# Patient Record
Sex: FEMALE | Race: OTHER | NOT HISPANIC OR LATINO | URBAN - METROPOLITAN AREA
[De-identification: names, ages, dates, MRNs, and addresses within clinical notes are randomized per-mention and may not be internally consistent; named-entity substitution may affect disease eponyms.]

---

## 2018-06-17 ENCOUNTER — INPATIENT (INPATIENT)
Facility: HOSPITAL | Age: 48
LOS: 0 days | Discharge: HOME | End: 2018-06-18
Attending: INTERNAL MEDICINE | Admitting: INTERNAL MEDICINE
Payer: COMMERCIAL

## 2018-06-17 VITALS
HEART RATE: 91 BPM | DIASTOLIC BLOOD PRESSURE: 83 MMHG | SYSTOLIC BLOOD PRESSURE: 139 MMHG | RESPIRATION RATE: 17 BRPM | TEMPERATURE: 98 F | OXYGEN SATURATION: 100 %

## 2018-06-17 DIAGNOSIS — Z29.9 ENCOUNTER FOR PROPHYLACTIC MEASURES, UNSPECIFIED: ICD-10-CM

## 2018-06-17 DIAGNOSIS — R07.9 CHEST PAIN, UNSPECIFIED: ICD-10-CM

## 2018-06-17 DIAGNOSIS — I20.0 UNSTABLE ANGINA: ICD-10-CM

## 2018-06-17 LAB
ALBUMIN SERPL ELPH-MCNC: 3.9 G/DL — SIGNIFICANT CHANGE UP (ref 3.3–5)
ALP SERPL-CCNC: 80 U/L — SIGNIFICANT CHANGE UP (ref 40–120)
ALT FLD-CCNC: 15 U/L — SIGNIFICANT CHANGE UP (ref 4–33)
AST SERPL-CCNC: 22 U/L — SIGNIFICANT CHANGE UP (ref 4–32)
BILIRUB SERPL-MCNC: 0.4 MG/DL — SIGNIFICANT CHANGE UP (ref 0.2–1.2)
BUN SERPL-MCNC: 10 MG/DL — SIGNIFICANT CHANGE UP (ref 7–23)
CALCIUM SERPL-MCNC: 8.7 MG/DL — SIGNIFICANT CHANGE UP (ref 8.4–10.5)
CHLORIDE SERPL-SCNC: 102 MMOL/L — SIGNIFICANT CHANGE UP (ref 98–107)
CO2 SERPL-SCNC: 24 MMOL/L — SIGNIFICANT CHANGE UP (ref 22–31)
CREAT SERPL-MCNC: 0.73 MG/DL — SIGNIFICANT CHANGE UP (ref 0.5–1.3)
D DIMER BLD IA.RAPID-MCNC: < 150 NG/ML — SIGNIFICANT CHANGE UP
GLUCOSE SERPL-MCNC: 103 MG/DL — HIGH (ref 70–99)
HCT VFR BLD CALC: 36.4 % — SIGNIFICANT CHANGE UP (ref 34.5–45)
HGB BLD-MCNC: 11.5 G/DL — SIGNIFICANT CHANGE UP (ref 11.5–15.5)
MCHC RBC-ENTMCNC: 28.9 PG — SIGNIFICANT CHANGE UP (ref 27–34)
MCHC RBC-ENTMCNC: 31.6 % — LOW (ref 32–36)
MCV RBC AUTO: 91.5 FL — SIGNIFICANT CHANGE UP (ref 80–100)
NRBC # FLD: 0 — SIGNIFICANT CHANGE UP
PLATELET # BLD AUTO: 271 K/UL — SIGNIFICANT CHANGE UP (ref 150–400)
PMV BLD: 9.8 FL — SIGNIFICANT CHANGE UP (ref 7–13)
POTASSIUM SERPL-MCNC: 4.4 MMOL/L — SIGNIFICANT CHANGE UP (ref 3.5–5.3)
POTASSIUM SERPL-SCNC: 4.4 MMOL/L — SIGNIFICANT CHANGE UP (ref 3.5–5.3)
PROT SERPL-MCNC: 6.9 G/DL — SIGNIFICANT CHANGE UP (ref 6–8.3)
RBC # BLD: 3.98 M/UL — SIGNIFICANT CHANGE UP (ref 3.8–5.2)
RBC # FLD: 13.5 % — SIGNIFICANT CHANGE UP (ref 10.3–14.5)
SODIUM SERPL-SCNC: 140 MMOL/L — SIGNIFICANT CHANGE UP (ref 135–145)
TROPONIN T, HIGH SENSITIVITY RESULT: < 6 NG/L — SIGNIFICANT CHANGE UP (ref ?–14)
TROPONIN T, HIGH SENSITIVITY RESULT: < 6 NG/L — SIGNIFICANT CHANGE UP (ref ?–14)
WBC # BLD: 4.66 K/UL — SIGNIFICANT CHANGE UP (ref 3.8–10.5)
WBC # FLD AUTO: 4.66 K/UL — SIGNIFICANT CHANGE UP (ref 3.8–10.5)

## 2018-06-17 PROCEDURE — 71046 X-RAY EXAM CHEST 2 VIEWS: CPT | Mod: 26

## 2018-06-17 RX ORDER — ASPIRIN/CALCIUM CARB/MAGNESIUM 324 MG
324 TABLET ORAL ONCE
Qty: 0 | Refills: 0 | Status: COMPLETED | OUTPATIENT
Start: 2018-06-17 | End: 2018-06-17

## 2018-06-17 RX ORDER — NORETHINDRONE AND ETHINYL ESTRADIOL 0.4-0.035
1 KIT ORAL
Qty: 0 | Refills: 0 | COMMUNITY

## 2018-06-17 RX ORDER — ASPIRIN/CALCIUM CARB/MAGNESIUM 324 MG
81 TABLET ORAL DAILY
Qty: 0 | Refills: 0 | Status: DISCONTINUED | OUTPATIENT
Start: 2018-06-18 | End: 2018-06-18

## 2018-06-17 RX ADMIN — Medication 324 MILLIGRAM(S): at 09:17

## 2018-06-17 NOTE — ED PROVIDER NOTE - MEDICAL DECISION MAKING DETAILS
CP, diaphoretic, lue numb EKG nonisch PLAN ce, dimer, xr, cdu for nuc stress. 06:30A CDU full, LUE symptoms persistent, will admit tele

## 2018-06-17 NOTE — H&P ADULT - RS GEN PE MLT RESP DETAILS PC
good air movement/no rales/no wheezes/no rhonchi/respirations non-labored/clear to auscultation bilaterally/airway patent/breath sounds equal/no chest wall tenderness

## 2018-06-17 NOTE — ED ADULT NURSE REASSESSMENT NOTE - NS ED NURSE REASSESS COMMENT FT1
Pt received to rm #8.  A+Ox3.  skin warm and dry.  resp even, non-labored.  well appearing female in nad.  c/o 3/10 chest pain w inspiration.  denies cough/fevers. Left AC SL dry and intact. VSS. Admitted.  Awaits bed assignment. continuous cardiac monitoring in progress.

## 2018-06-17 NOTE — ED ADULT TRIAGE NOTE - CHIEF COMPLAINT QUOTE
pt. came in c/o intermittent left sided chest pain radiating to left arm and back since last night around 7pm. pt. states she woke up an hour ago with same pain and diaphoresis and tingling sensation on left arm. denies any n/v nor SOB. no PMHx. EKG in progress.

## 2018-06-17 NOTE — H&P ADULT - NEGATIVE CARDIOVASCULAR SYMPTOMS
no paroxysmal nocturnal dyspnea/no dyspnea on exertion/no orthopnea/no peripheral edema/no palpitations/no claudication

## 2018-06-17 NOTE — ED ADULT NURSE NOTE - OBJECTIVE STATEMENT
alert c/o sternal chest pain currently radiating to left upper back and left arm  left arm is also numb  c/o SOB  breath sounds audible mateus clear  SaO2 100% NSR on scope

## 2018-06-17 NOTE — H&P ADULT - ATTENDING COMMENTS
48 year old woman with no reported medical history and on OCP presents with chest pain concerning for angina.    #Angina-  Patient has ruled out for ACS and there are no ischemic EKG changes.  D-dimer negative.  Plan for further ischemic evaluation with exercise nuclear stress test.  Urine hcg negative in ER.     Care discussed with patient and family at bedside.

## 2018-06-17 NOTE — H&P ADULT - HISTORY OF PRESENT ILLNESS
47 yo female with NO Significant PMHx p/w chest pain at rest since last night, worse over night. Chest pain described as substernal chest tightness, 6/10 at 1900, radiating to left scapula, pleuritic in nature. Pt's pain was tolerable and she went to sleep. At 2am chest pain worsened and woke her up from sleep and constant in nature, lasting 2-3 hrs accompanied by cold sweat and her  brought to [facility] ED. She denies fever, chills, palpitations, nausea, vomiting or abdominal pain. Never felt like this before with no ischemic work up in the past.

## 2018-06-17 NOTE — ED PROVIDER NOTE - PROGRESS NOTE DETAILS
ELA DAVISON:  Chest pain improved.  D dimer negative.  Pt accepted to Dr. Rodriguez. ELA DAVISON:  Chest pain improved.  D dimer negative.  CDU full. Pt accepted to Dr. Rodriguez.

## 2018-06-17 NOTE — ED PROVIDER NOTE - OBJECTIVE STATEMENT
pt. came in c/o intermittent left sided chest pain radiating to left arm and back since last night around 7pm. pt. states she woke up an hour ago with same pain and diaphoresis and tingling sensation on left arm. denies any n/v nor SOB. no PMHx. EKG in progress.  chest pain  06:03 Marie att: 48F c/o cp 06:03 Marie att: 48F c/o cp x hours. Yest 19:00 patient at rest when she noted cp, midsternal, x seconds. This morning 2A cp, midst cp, lue numb, sob, pleurisy. HEART one PERC ocp PMH x PSH x MED ocp FH x 06:03 Marie att: 48F c/o cp x hours. Yest 19:00 patient at rest when she noted cp, midsternal, x seconds. This morning 2A cp, midst cp, lue numb, sob, diaphoretic, pleurisy. At present cp resolved, lue persistently numb. HEART one PERC ocp PMH x PSH x MED ocp FH x 06:03 Marie att: 48F c/o cp x hours. Yest 19:00 patient at rest when she noted cp, midsternal, x seconds. This morning 2A cp, midst cp, lue numb, diaphoretic, sob, pleurisy. At present cp resolved but lue persistently numb. HEART 1 (age) PERC ocp PMH x PSH x MED ocp FH x

## 2018-06-17 NOTE — H&P ADULT - ASSESSMENT
49 yo female with NO Significant PMHx p/w chest pain at rest since last night, worse over night admitted to tele for unstable angina.

## 2018-06-17 NOTE — ED PROVIDER NOTE - ATTENDING CONTRIBUTION TO CARE
Dr. Salazar: I performed a face to face bedside interview with patient regarding history of present illness, review of symptoms and past medical history. I completed an independent physical exam.  I have discussed patient's plan of care with PA.   I agree with note as stated above, having amended the EMR as needed to reflect my findings.   This includes HISTORY OF PRESENT ILLNESS, HIV, PAST MEDICAL/SURGICAL/FAMILY/SOCIAL HISTORY, ALLERGIES AND HOME MEDICATIONS, REVIEW OF SYSTEMS, PHYSICAL EXAM, and any PROGRESS NOTES during the time I functioned as the attending physician for this patient. HPI above as by me. PE above as by me. DDX CP, diaphoretic, lue numb EKG nonisch PLAN ce, dimer, xr, cdu for nuc stress. 06:30A CDU full, LUE symptoms persistent, will admit tele Dr. Salazar: I performed a face to face bedside interview with patient regarding history of present illness, review of symptoms and past medical history. I completed an independent physical exam.  I have discussed patient's plan of care with PA.   I agree with note as stated above, having amended the EMR as needed to reflect my findings.   This includes HISTORY OF PRESENT ILLNESS, HIV, PAST MEDICAL/SURGICAL/FAMILY/SOCIAL HISTORY, ALLERGIES AND HOME MEDICATIONS, REVIEW OF SYSTEMS, PHYSICAL EXAM, and any PROGRESS NOTES during the time I functioned as the attending physician for this patient. HPI above as by me. PE above as by me. DDX CP, diaphoretic, lue numb EKG nonisch PLAN ce, dimer, xr, cdu for nuc stress. 06:30A CDU full, LUE symptoms persistent, will admit tele r/o acs

## 2018-06-17 NOTE — H&P ADULT - PROBLEM SELECTOR PLAN 1
tele monitor   cardiac enzymes x 2  Serial EKGs  DASH diet  FLP  start on ASA 81mg po daily  consider NST exercise

## 2018-06-18 ENCOUNTER — TRANSCRIPTION ENCOUNTER (OUTPATIENT)
Age: 48
End: 2018-06-18

## 2018-06-18 VITALS
OXYGEN SATURATION: 100 % | DIASTOLIC BLOOD PRESSURE: 82 MMHG | RESPIRATION RATE: 16 BRPM | SYSTOLIC BLOOD PRESSURE: 122 MMHG | HEART RATE: 81 BPM | TEMPERATURE: 98 F

## 2018-06-18 LAB
BUN SERPL-MCNC: 15 MG/DL — SIGNIFICANT CHANGE UP (ref 7–23)
CALCIUM SERPL-MCNC: 8.8 MG/DL — SIGNIFICANT CHANGE UP (ref 8.4–10.5)
CHLORIDE SERPL-SCNC: 106 MMOL/L — SIGNIFICANT CHANGE UP (ref 98–107)
CO2 SERPL-SCNC: 25 MMOL/L — SIGNIFICANT CHANGE UP (ref 22–31)
CREAT SERPL-MCNC: 0.77 MG/DL — SIGNIFICANT CHANGE UP (ref 0.5–1.3)
GLUCOSE SERPL-MCNC: 99 MG/DL — SIGNIFICANT CHANGE UP (ref 70–99)
HCT VFR BLD CALC: 36.4 % — SIGNIFICANT CHANGE UP (ref 34.5–45)
HGB BLD-MCNC: 11.3 G/DL — LOW (ref 11.5–15.5)
MCHC RBC-ENTMCNC: 28.4 PG — SIGNIFICANT CHANGE UP (ref 27–34)
MCHC RBC-ENTMCNC: 31 % — LOW (ref 32–36)
MCV RBC AUTO: 91.5 FL — SIGNIFICANT CHANGE UP (ref 80–100)
NRBC # FLD: 0 — SIGNIFICANT CHANGE UP
PLATELET # BLD AUTO: 256 K/UL — SIGNIFICANT CHANGE UP (ref 150–400)
PMV BLD: 10.3 FL — SIGNIFICANT CHANGE UP (ref 7–13)
POTASSIUM SERPL-MCNC: 4.3 MMOL/L — SIGNIFICANT CHANGE UP (ref 3.5–5.3)
POTASSIUM SERPL-SCNC: 4.3 MMOL/L — SIGNIFICANT CHANGE UP (ref 3.5–5.3)
RBC # BLD: 3.98 M/UL — SIGNIFICANT CHANGE UP (ref 3.8–5.2)
RBC # FLD: 13.7 % — SIGNIFICANT CHANGE UP (ref 10.3–14.5)
SODIUM SERPL-SCNC: 141 MMOL/L — SIGNIFICANT CHANGE UP (ref 135–145)
WBC # BLD: 5.14 K/UL — SIGNIFICANT CHANGE UP (ref 3.8–10.5)
WBC # FLD AUTO: 5.14 K/UL — SIGNIFICANT CHANGE UP (ref 3.8–10.5)

## 2018-06-18 PROCEDURE — 93018 CV STRESS TEST I&R ONLY: CPT | Mod: GC

## 2018-06-18 PROCEDURE — 93016 CV STRESS TEST SUPVJ ONLY: CPT | Mod: GC

## 2018-06-18 PROCEDURE — 78452 HT MUSCLE IMAGE SPECT MULT: CPT | Mod: 26

## 2018-06-18 PROCEDURE — 93306 TTE W/DOPPLER COMPLETE: CPT | Mod: 26

## 2018-06-18 RX ORDER — ASPIRIN/CALCIUM CARB/MAGNESIUM 324 MG
1 TABLET ORAL
Qty: 30 | Refills: 0 | OUTPATIENT
Start: 2018-06-18 | End: 2018-07-17

## 2018-06-18 NOTE — DISCHARGE NOTE ADULT - CARE PLAN
Principal Discharge DX:	Chest pain  Goal:	Continue to monitor, remain chest pain free.  Assessment and plan of treatment:	Continue to take Aspirin 81mg as prescribed. Principal Discharge DX:	Chest pain  Goal:	Continue to monitor, remain chest pain free.  Assessment and plan of treatment:	Your echocardiogram and your nuclear stress test results were negative. Continue to take Aspirin 81mg as prescribed.

## 2018-06-18 NOTE — DISCHARGE NOTE ADULT - MEDICATION SUMMARY - MEDICATIONS TO TAKE
I will START or STAY ON the medications listed below when I get home from the hospital:    aspirin 81 mg oral delayed release tablet  -- 1 tab(s) by mouth once a day  -- Indication: For HEART     Lo Loestrin Fe oral tablet  -- 1 tab(s) by mouth once a day  -- Indication: For OCP

## 2018-06-18 NOTE — DISCHARGE NOTE ADULT - PATIENT PORTAL LINK FT
You can access the [facility], offered by [facility], by registering with the following website: [email or web address]

## 2018-06-18 NOTE — DISCHARGE NOTE ADULT - PROVIDER TOKENS
FREE:[LAST:[FOLLOW UP],PHONE:[(   )    -],FAX:[(   )    -],ADDRESS:[WITH PRIVATE PRIMARY CARE PHYSICIAN]]

## 2018-06-18 NOTE — DISCHARGE NOTE ADULT - HOSPITAL COURSE
47 yo female with NO Significant PMHx p/w chest pain at rest since last night, worse over night admitted to [facility] for chest pain - r/o acs      6/18 HR curtis to 48-49 bpm - Asymptx, VSS    EKG: NSR @ 89bpm  D-Dimer<150  Yasmin x2: Neg  CXR: neg  UCG: neg    ECHO:     NST:     Patient seen and examined. Results of inpatient examinations are reviewed. Clinically improved, patient denies chest pain. Medically stable for discharge as per Dr. Rodriguez with outpatient follow-up within one week. Patient demonstrates understanding of above plan. 49 yo female with NO Significant PMHx p/w chest pain at rest since last night, worse over night admitted to [facility] for chest pain - r/o acs      6/18 HR curtis to 48-49 bpm - Asymptx, VSS    EKG: NSR @ 89bpm  D-Dimer<150  Yasmin x2: Neg  CXR: neg  UCG: neg    ECHO: 1. Normal mitral valve.  2. Normal trileaflet aortic valve.  3. Normal left ventricular internal dimensions and wall  thicknesses.  4. Normal left ventricular systolic function. No segmental  wall motion abnormalities.  5. Normal right ventricular size and function. EF 65%      NST: * Myocardial Perfusion SPECT results are normal at 88 % of  MPHR.  * Review of raw data shows: The study is of good technical  quality.  * The left ventricle was normal in size. Normal myocardial  perfusion scan,with no evidence of infarction or inducible  ischemia.  * Post-stress gated wall motion analysis was performed  (LVEF = 56 %;LVEDV = 64 ml.), revealing normal LV  function. RV function appeared normal.    Patient seen and examined. Results of inpatient examinations are reviewed. Clinically improved, patient denies chest pain. Medically stable for discharge as per Dr. Rodriguez with outpatient follow-up within one week. Patient demonstrates understanding of above plan. 47 yo female with NO Significant PMHx p/w chest pain at rest since last night, worse over night admitted to [facility] for chest pain - r/o acs      6/18 HR curtis to 48-49 bpm - Asymptx, VSS    EKG: NSR @ 89bpm  D-Dimer<150  Yasmin x2: Neg  CXR: neg  UCG: neg    ECHO 6/18/18   : 1. Normal mitral valve.  2. Normal trileaflet aortic valve.  3. Normal left ventricular internal dimensions and wall  thicknesses.  4. Normal left ventricular systolic function. No segmental  wall motion abnormalities.  5. Normal right ventricular size and function. EF 65%      NST: * Myocardial Perfusion SPECT results are normal at 88 % of  MPHR.  * Review of raw data shows: The study is of good technical  quality.  * The left ventricle was normal in size. Normal myocardial  perfusion scan,with no evidence of infarction or inducible  ischemia.  * Post-stress gated wall motion analysis was performed  (LVEF = 56 %;LVEDV = 64 ml.), revealing normal LV  function. RV function appeared normal.    Patient seen and examined. Results of inpatient examinations are reviewed. Clinically improved, patient denies chest pain. Medically stable for discharge as per Dr. Rodriguez with outpatient follow-up within one week. Patient demonstrates understanding of above plan. 47 yo female with NO Significant PMHx p/w chest pain at rest since last night, worse over night admitted to [facility] for chest pain - r/o acs      6/18 HR curtis to 48-49 bpm - Asymptx, VSS    EKG: NSR @ 89bpm  D-Dimer<150  Yasmin x2: Neg  CXR: neg  UCG: neg    ECHO 6/18/18   : 1. Normal mitral valve.  2. Normal trileaflet aortic valve.  3. Normal left ventricular internal dimensions and wall  thicknesses.  4. Normal left ventricular systolic function. No segmental  wall motion abnormalities.  5. Normal right ventricular size and function. EF 65%      NST: * Myocardial Perfusion SPECT results are normal at 88 % of  MPHR.  * Review of raw data shows: The study is of good technical  quality.  * The left ventricle was normal in size. Normal myocardial  perfusion scan,with no evidence of infarction or inducible  ischemia.  * Post-stress gated wall motion analysis was performed  (LVEF = 56 %;LVEDV = 64 ml.), revealing normal LV  function. RV function appeared normal.    Patient seen and examined. Results of inpatient examinations are reviewed. Clinically improved, patient denies chest pain. Medically stable for discharge as per Dr. Rodriguez with outpatient PMD- Dr. Reagan follow-up within one week. Patient demonstrates understanding of above plan.

## 2018-06-18 NOTE — PROGRESS NOTE ADULT - SUBJECTIVE AND OBJECTIVE BOX
Cardiovascular Disease Progress Note    Overnight events: No acute events overnight. Ms. Encinas is seen in the stress lab.     Otherwise review of systems negative    Objective Findings:  T(C): 36.8 (06-18-18 @ 04:18), Max: 37.1 (06-17-18 @ 17:47)  HR: 73 (06-18-18 @ 04:18) (72 - 84)  BP: 106/75 (06-18-18 @ 04:18) (106/70 - 119/80)  RR: 17 (06-18-18 @ 04:18) (16 - 19)  SpO2: 100% (06-18-18 @ 04:18) (99% - 100%)  Wt(kg): --  Daily     Daily       Physical Exam:  Gen: NAD  HEENT: EOMI  CV: RRR, normal S1 + S2, no m/r/g  Lungs: CTAB  Abd: soft, non-tender  Ext: No edema    Telemetry: Sinus    Laboratory Data:                        11.3   5.14  )-----------( 256      ( 18 Jun 2018 05:45 )             36.4     06-18    141  |  106  |  15  ----------------------------<  99  4.3   |  25  |  0.77    Ca    8.8      18 Jun 2018 05:45    TPro  6.9  /  Alb  3.9  /  TBili  0.4  /  DBili  x   /  AST  22  /  ALT  15  /  AlkPhos  80  06-17              Inpatient Medications:  MEDICATIONS  (STANDING):  aspirin enteric coated 81 milliGRAM(s) Oral daily      Assessment: 48 year old woman with no reported medical history and on OCP presents with chest pain concerning for angina.    #Angina-  Patient has ruled out for ACS and there are no ischemic EKG changes.  D-dimer negative.  Discharge planning pending stress results.    Care discussed with patient in the stress lab.   Over 35 minutes spent on total encounter; more than 50% of the visit was spent counseling and/or coordinating care by the attending physician.      Javier Rodriguez MD [facility]  Cardiovascular Disease  [phone]

## 2018-06-18 NOTE — DISCHARGE NOTE ADULT - PLAN OF CARE
Continue to monitor, remain chest pain free. Continue to take Aspirin 81mg as prescribed. Your echocardiogram and your nuclear stress test results were negative. Continue to take Aspirin 81mg as prescribed.

## 2021-04-18 NOTE — ED PROVIDER NOTE - CPE EDP PSYCH NORM
PA instructed nurse to standby until procalcitonin results in Called out to PCP followed protocol Pt will be for possible transfusion pending Type and Screen 1 unit PRBC will transfuse 2units PRBC normal...

## 2024-04-30 ENCOUNTER — APPOINTMENT (OUTPATIENT)
Dept: PODIATRY | Facility: CLINIC | Age: 54
End: 2024-04-30
Payer: COMMERCIAL

## 2024-04-30 DIAGNOSIS — Z78.0 ASYMPTOMATIC MENOPAUSAL STATE: ICD-10-CM

## 2024-04-30 DIAGNOSIS — Z82.49 FAMILY HISTORY OF ISCHEMIC HEART DISEASE AND OTHER DISEASES OF THE CIRCULATORY SYSTEM: ICD-10-CM

## 2024-04-30 DIAGNOSIS — M25.569 PAIN IN UNSPECIFIED KNEE: ICD-10-CM

## 2024-04-30 PROBLEM — Z00.00 ENCOUNTER FOR PREVENTIVE HEALTH EXAMINATION: Status: ACTIVE | Noted: 2024-04-30

## 2024-04-30 PROCEDURE — 73630 X-RAY EXAM OF FOOT: CPT | Mod: RT

## 2024-04-30 PROCEDURE — 99203 OFFICE O/P NEW LOW 30 MIN: CPT

## 2024-05-01 NOTE — HISTORY OF PRESENT ILLNESS
[FreeTextEntry1] : Patient presents today with her  with bilateral retrocalcaneal heel pain.  Patient has difficulty ambulating; she is stiff when she gets up in the morning and then when she walks for a while, it tends to loosen up.  She has pain in the retrocalcaneal area of both heels; the right foot seems to be more symptomatic than the left.

## 2024-05-01 NOTE — PHYSICAL EXAM
[2+] : left foot dorsalis pedis 2+ [No Joint Swelling] : no joint swelling [Normal Foot/Ankle] : Both lower extremities were exposed and visualized. Standing exam demonstrates normal foot posture and alignment. Hindfoot exam shows no hindfoot valgus or varus [Skin Color & Pigmentation] : normal skin color and pigmentation [Skin Turgor] : normal skin turgor [Skin Lesions] : no skin lesions [Sensation] : the sensory exam was normal to light touch and pinprick [No Focal Deficits] : no focal deficits [Deep Tendon Reflexes (DTR)] : deep tendon reflexes were 2+ and symmetric [Motor Exam] : the motor exam was normal [General Appearance - Alert] : alert [Oriented To Time, Place, And Person] : oriented to person, place, and time [Ankle Swelling (On Exam)] : not present [Varicose Veins Of Lower Extremities] : not present [] : not present [Delayed in the Right Toes] : capillary refills normal in right toes [Delayed in the Left Toes] : capillary refills normal in the left toes [de-identified] : Pain at the insertion of the Achilles tendon.  Pain with dorsiflexion of the ankle.  Pain, medially and laterally at the insertion.  No pain within the watershed area of the Achilles tendon in either foot.  The right foot is more symptomatic on palpation. [Foot Ulcer] : no foot ulcer [Skin Induration] : no skin induration

## 2024-05-01 NOTE — ASSESSMENT
[FreeTextEntry1] : Impression: Enthesopathy (M77.9) with Achilles tendonitis, bilateral (M76.61), painful (M79.671).    Treatment: Patient was given Mobic, which she had been given by another doctor but never picked up.  She was advised to take that for 2 weeks.  She was dispensed a Cam boot by the orthotist to immobilize the foot.  Once the area calms down, will send her for physical therapy and eccentric muscle strengthening.  She was advised that if it started to feel better in 7 - 10 days, she can switch the boot to the contralateral limbs if it is still hurting her.  Any questions or problems, patient is to contact the office.

## 2024-05-01 NOTE — PROCEDURE
[FreeTextEntry1] : X-rays were taken to rule out for any fractures.   (DP, medial oblique, lateral - both feet) Both feet show no evidence of any fracture/dislocation.  No evidence of any spurring at the posterior aspect of the heel.

## 2024-05-01 NOTE — CONSULT LETTER
[Dear  ___] : Dear  [unfilled], [Courtesy Letter:] : I had the pleasure of seeing your patient, [unfilled], in my office today. [Please see my note below.] : Please see my note below. [Consult Closing:] : Thank you very much for allowing me to participate in the care of this patient.  If you have any questions, please do not hesitate to contact me. [Sincerely,] : Sincerely, [FreeTextEntry2] : Melida Solomon [address] [place] [address]  [FreeTextEntry3] : Charles M. Lombardi, DPM, FACFAS Systems Chief, Podiatric Services [facility] Assistant Professor of Surgery [facility] [employer] [facility] [employer] [facility]

## 2024-05-14 ENCOUNTER — APPOINTMENT (OUTPATIENT)
Dept: PODIATRY | Facility: CLINIC | Age: 54
End: 2024-05-14
Payer: COMMERCIAL

## 2024-05-14 DIAGNOSIS — M79.671 PAIN IN RIGHT FOOT: ICD-10-CM

## 2024-05-14 DIAGNOSIS — M79.672 PAIN IN RIGHT FOOT: ICD-10-CM

## 2024-05-14 DIAGNOSIS — M77.9 ENTHESOPATHY, UNSPECIFIED: ICD-10-CM

## 2024-05-14 DIAGNOSIS — M76.61 ACHILLES TENDINITIS, RIGHT LEG: ICD-10-CM

## 2024-05-14 DIAGNOSIS — M76.62 ACHILLES TENDINITIS, RIGHT LEG: ICD-10-CM

## 2024-05-14 PROCEDURE — 99213 OFFICE O/P EST LOW 20 MIN: CPT

## 2024-05-15 NOTE — PHYSICAL EXAM
[2+] : left foot dorsalis pedis 2+ [No Joint Swelling] : no joint swelling [Normal Foot/Ankle] : Both lower extremities were exposed and visualized. Standing exam demonstrates normal foot posture and alignment. Hindfoot exam shows no hindfoot valgus or varus [Skin Color & Pigmentation] : normal skin color and pigmentation [Skin Turgor] : normal skin turgor [Skin Lesions] : no skin lesions [Sensation] : the sensory exam was normal to light touch and pinprick [No Focal Deficits] : no focal deficits [Deep Tendon Reflexes (DTR)] : deep tendon reflexes were 2+ and symmetric [Motor Exam] : the motor exam was normal [General Appearance - Alert] : alert [Oriented To Time, Place, And Person] : oriented to person, place, and time [Ankle Swelling (On Exam)] : not present [Varicose Veins Of Lower Extremities] : not present [] : not present [Delayed in the Right Toes] : capillary refills normal in right toes [Delayed in the Left Toes] : capillary refills normal in the left toes [de-identified] : Pain at the retrocalcaneal area of both heels at the insertion of the Achilles tendon.  Pain with dorsiflexion of the ankle.  Pain, medially and laterally at the insertion.  No pain within the watershed area of the Achilles tendon in either foot.  The right foot is more symptomatic on palpation. [Foot Ulcer] : no foot ulcer [Skin Induration] : no skin induration

## 2024-05-15 NOTE — HISTORY OF PRESENT ILLNESS
[FreeTextEntry1] : Patient presents today with recurrent bilateral Achilles tendonitis.  She attempted to wear the boot but was traveling a great deal and was unable to do so.  She still has pain at the retrocalcaneal area of both heels.  She is wearing [employer] shoes which seem to help.  No new changes to her medical status.

## 2024-05-15 NOTE — ASSESSMENT
[FreeTextEntry1] : Impression: Enthesopathy (M77.9) with Achilles tendonitis, bilateral (M76.61), painful (M79.671).  Treatment: Since she is not wearing the Cam boot, I feel that she might benefit from a gauntlet brace to help decrease pain, help soft tissue recovery and improve biomechanics to prevent recurrence and exacerbation.  She was referred to the orthotist in order to provide her with bilateral devices.  Any questions or problems, patient is to contact the office.

## 2024-05-28 ENCOUNTER — APPOINTMENT (OUTPATIENT)
Dept: PODIATRY | Facility: CLINIC | Age: 54
End: 2024-05-28

## 2024-06-11 ENCOUNTER — APPOINTMENT (OUTPATIENT)
Dept: PODIATRY | Facility: CLINIC | Age: 54
End: 2024-06-11